# Patient Record
Sex: MALE | Race: WHITE | NOT HISPANIC OR LATINO
[De-identification: names, ages, dates, MRNs, and addresses within clinical notes are randomized per-mention and may not be internally consistent; named-entity substitution may affect disease eponyms.]

---
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
## 2018-03-28 ENCOUNTER — APPOINTMENT (OUTPATIENT)
Dept: NEUROLOGY | Facility: CLINIC | Age: 18
End: 2018-03-28
Payer: COMMERCIAL

## 2018-03-28 VITALS — BODY MASS INDEX: 20.88 KG/M2 | HEIGHT: 67 IN | WEIGHT: 133 LBS

## 2018-03-28 PROCEDURE — 99214 OFFICE O/P EST MOD 30 MIN: CPT

## 2018-06-27 ENCOUNTER — APPOINTMENT (OUTPATIENT)
Dept: NEUROLOGY | Facility: CLINIC | Age: 18
End: 2018-06-27
Payer: COMMERCIAL

## 2018-06-27 VITALS
WEIGHT: 128 LBS | BODY MASS INDEX: 20.09 KG/M2 | SYSTOLIC BLOOD PRESSURE: 107 MMHG | HEART RATE: 65 BPM | HEIGHT: 67 IN | DIASTOLIC BLOOD PRESSURE: 65 MMHG

## 2018-06-27 PROCEDURE — 99214 OFFICE O/P EST MOD 30 MIN: CPT

## 2018-06-28 ENCOUNTER — TRANSCRIPTION ENCOUNTER (OUTPATIENT)
Age: 18
End: 2018-06-28

## 2018-07-27 ENCOUNTER — TRANSCRIPTION ENCOUNTER (OUTPATIENT)
Age: 18
End: 2018-07-27

## 2018-08-25 ENCOUNTER — TRANSCRIPTION ENCOUNTER (OUTPATIENT)
Age: 18
End: 2018-08-25

## 2018-08-29 ENCOUNTER — APPOINTMENT (OUTPATIENT)
Dept: NEUROLOGY | Facility: CLINIC | Age: 18
End: 2018-08-29
Payer: COMMERCIAL

## 2018-08-29 VITALS
HEART RATE: 96 BPM | SYSTOLIC BLOOD PRESSURE: 108 MMHG | WEIGHT: 128 LBS | BODY MASS INDEX: 20.09 KG/M2 | DIASTOLIC BLOOD PRESSURE: 65 MMHG | HEIGHT: 67 IN

## 2018-08-29 PROCEDURE — 99214 OFFICE O/P EST MOD 30 MIN: CPT

## 2018-12-26 ENCOUNTER — APPOINTMENT (OUTPATIENT)
Dept: NEUROLOGY | Facility: CLINIC | Age: 18
End: 2018-12-26
Payer: COMMERCIAL

## 2018-12-26 VITALS
BODY MASS INDEX: 20.88 KG/M2 | HEART RATE: 98 BPM | WEIGHT: 133 LBS | SYSTOLIC BLOOD PRESSURE: 110 MMHG | DIASTOLIC BLOOD PRESSURE: 74 MMHG | HEIGHT: 67 IN

## 2018-12-26 PROCEDURE — 99214 OFFICE O/P EST MOD 30 MIN: CPT

## 2018-12-28 ENCOUNTER — TRANSCRIPTION ENCOUNTER (OUTPATIENT)
Age: 18
End: 2018-12-28

## 2019-01-23 ENCOUNTER — RX RENEWAL (OUTPATIENT)
Age: 19
End: 2019-01-23

## 2019-02-07 ENCOUNTER — MEDICATION RENEWAL (OUTPATIENT)
Age: 19
End: 2019-02-07

## 2019-03-05 ENCOUNTER — MEDICATION RENEWAL (OUTPATIENT)
Age: 19
End: 2019-03-05

## 2019-04-24 ENCOUNTER — APPOINTMENT (OUTPATIENT)
Dept: NEUROLOGY | Facility: CLINIC | Age: 19
End: 2019-04-24
Payer: COMMERCIAL

## 2019-04-24 VITALS
DIASTOLIC BLOOD PRESSURE: 77 MMHG | WEIGHT: 133 LBS | HEART RATE: 100 BPM | BODY MASS INDEX: 20.88 KG/M2 | SYSTOLIC BLOOD PRESSURE: 111 MMHG | HEIGHT: 67 IN

## 2019-04-24 PROCEDURE — 99214 OFFICE O/P EST MOD 30 MIN: CPT

## 2019-04-24 NOTE — ASSESSMENT
[FreeTextEntry1] : A/P-\par 1. Seizure disorder (past abnormal interictal EEG)- no seizures since on CBZ started. No definite adverse effects or lab abnormalities last time checked. \par - needs new set of labs including CBC, CMP, CBZ level, B12, TSH, A1C. CBZ level should be performed within one hour of CBZ dose.\par - Facility would like to hold on an Epidiolex trial\par \par 2. Behavior/ Impulse Control - Slightly better overall still with occasional head banging and rare biting when frustrated.\par - continue Abilify (would likely tolerate higher doses because of the CBZ hepatic induction)\par - continue clonidine (/77)\par - can consider low dose Kln if behavior worsens (start at 0.125 qd and increase to bid). If stopped would need slow taper considering risk of seizures.\par \par 3. Other- \par -would give extra 100 mg bid of CBZ on starting day prior to dental work for total 4 days\par - script for CBZ  sent\par \par \par f/u in 4-6m\par \par \par

## 2019-04-24 NOTE — PHYSICAL EXAM
[FreeTextEntry1] : Neurological Exam- \par Mental status-alert interacts with his mother and father, some vocalizations, self stim mvmts and laughter.\par Overall more placid than usual\par CN- EOM appear conjugate, face symmetric\par Motor - moves all extremities equally, no chorea or athetosis, no dystonia, no significant tremor\par Sensory- Deferred\par Gait- narrow based and steady, \par

## 2019-04-24 NOTE — HISTORY OF PRESENT ILLNESS
[FreeTextEntry1] : cc- f/u autism and seizures\par \par No seizures or suspicious events since last visit.\par Behavior overall better- Less head banging, biting etc.\par Did have head banging episode at the residential facility two weeks ago-?mother believes he may have had HA and did not get Tylenol.\par Sleep is about the same.\par Gained 8 lbs in last 9 days at home.\par \par meds- Abilify 5mg tid\par carbamazepine  bid \par clonidine 0.2mg hs \par vit D\par probiotics\par acetominophen 1000 mg prn

## 2019-05-06 ENCOUNTER — OTHER (OUTPATIENT)
Age: 19
End: 2019-05-06

## 2019-06-07 ENCOUNTER — MOBILE ON CALL (OUTPATIENT)
Age: 19
End: 2019-06-07

## 2019-06-10 ENCOUNTER — CHART COPY (OUTPATIENT)
Age: 19
End: 2019-06-10

## 2019-06-21 ENCOUNTER — OTHER (OUTPATIENT)
Age: 19
End: 2019-06-21

## 2019-06-26 ENCOUNTER — APPOINTMENT (OUTPATIENT)
Dept: NEUROLOGY | Facility: CLINIC | Age: 19
End: 2019-06-26
Payer: COMMERCIAL

## 2019-06-26 VITALS
BODY MASS INDEX: 20.4 KG/M2 | DIASTOLIC BLOOD PRESSURE: 68 MMHG | SYSTOLIC BLOOD PRESSURE: 105 MMHG | HEIGHT: 67 IN | HEART RATE: 81 BPM | WEIGHT: 130 LBS

## 2019-06-26 PROCEDURE — 99214 OFFICE O/P EST MOD 30 MIN: CPT

## 2019-06-26 NOTE — PHYSICAL EXAM
[FreeTextEntry1] : Neurological Exam- \par Mental status-alert interacts with his mother and father, some vocalizations, self stim mvmts and laughter.\par CN- EOM appear conjugate, face symmetric\par Motor -No tics now, moves all extremities equally, no chorea or athetosis, no dystonia, no significant tremor\par Sensory- Deferred\par Gait- narrow based and steady, \par

## 2019-06-26 NOTE — ASSESSMENT
[FreeTextEntry1] : A/P-\par 1. Seizure disorder (past abnormal interictal EEG)-two breakthrough seizures in May without clear triggers. \par -continue cbz at 600/d\par -awaiting an Epidiolex trial\par \par 2. Behavior/ Impulse Control - worse since two seizures and CBZ increased\par - CT head to r/o brain contusion , SDH etc.\par - Increase Abilify (likely lower levels because of the CBZ hepatic induction after increasing dose)\par - continue clonidine- BP stable\par \par 3. Other- \par -will hold off on vaccinations considering recent seizures\par \par f/u in 3-4 m\par \par \par f/u in 4-6m\par \par \par

## 2019-06-26 NOTE — HISTORY OF PRESENT ILLNESS
[FreeTextEntry1] : cc- f/u autism and seizures\par \par Had two seizures in May - no clear trigger. We increased the /d. Now more head banging etc.\par Family concerned about possible brain trauma- to have CT under sedation.\par No seizures since end of May. \par Gained a few pounds since at home. \par \par meds- Abilify 5mg tid\par carbamazepine  tid\par clonidine 0.2mg hs \par vit D\par probiotics\par acetominophen 1000 mg prn

## 2019-06-27 ENCOUNTER — OTHER (OUTPATIENT)
Age: 19
End: 2019-06-27

## 2019-07-01 ENCOUNTER — OUTPATIENT (OUTPATIENT)
Dept: OUTPATIENT SERVICES | Facility: HOSPITAL | Age: 19
LOS: 1 days | End: 2019-07-01
Payer: COMMERCIAL

## 2019-07-01 ENCOUNTER — APPOINTMENT (OUTPATIENT)
Age: 19
End: 2019-07-01

## 2019-07-01 VITALS — WEIGHT: 130.07 LBS

## 2019-07-01 DIAGNOSIS — Z92.89 PERSONAL HISTORY OF OTHER MEDICAL TREATMENT: Chronic | ICD-10-CM

## 2019-07-01 DIAGNOSIS — R56.9 UNSPECIFIED CONVULSIONS: ICD-10-CM

## 2019-07-01 DIAGNOSIS — H65.23 CHRONIC SEROUS OTITIS MEDIA, BILATERAL: Chronic | ICD-10-CM

## 2019-07-01 DIAGNOSIS — Z00.00 ENCOUNTER FOR GENERAL ADULT MEDICAL EXAMINATION WITHOUT ABNORMAL FINDINGS: ICD-10-CM

## 2019-07-01 PROCEDURE — 70450 CT HEAD/BRAIN W/O DYE: CPT | Mod: 26

## 2019-07-01 PROCEDURE — 70450 CT HEAD/BRAIN W/O DYE: CPT

## 2019-07-08 ENCOUNTER — MEDICATION RENEWAL (OUTPATIENT)
Age: 19
End: 2019-07-08

## 2019-07-09 ENCOUNTER — MEDICATION RENEWAL (OUTPATIENT)
Age: 19
End: 2019-07-09

## 2019-07-09 RX ORDER — CANNABIDIOL 100 MG/ML
100 SOLUTION ORAL
Qty: 1 | Refills: 0 | Status: ACTIVE | OUTPATIENT
Start: 2019-01-23

## 2019-08-21 ENCOUNTER — MEDICATION RENEWAL (OUTPATIENT)
Age: 19
End: 2019-08-21

## 2019-08-21 ENCOUNTER — APPOINTMENT (OUTPATIENT)
Dept: NEUROLOGY | Facility: CLINIC | Age: 19
End: 2019-08-21
Payer: COMMERCIAL

## 2019-08-21 VITALS
BODY MASS INDEX: 20.4 KG/M2 | HEART RATE: 84 BPM | DIASTOLIC BLOOD PRESSURE: 78 MMHG | SYSTOLIC BLOOD PRESSURE: 122 MMHG | HEIGHT: 67 IN | WEIGHT: 130 LBS

## 2019-08-21 PROCEDURE — 99214 OFFICE O/P EST MOD 30 MIN: CPT

## 2019-08-21 NOTE — HISTORY OF PRESENT ILLNESS
[FreeTextEntry1] : cc- f/u autism and seizures\par \par Since last visit started Epidiolex started 8/12 at 150 bid and increased to 300 bid three days ago.\par No seizures since last visit. A bit more related to his family, possibly from the CBD. \par Family still believes that the higher dose carbamazepine causes more behavioral issues, despite the increase in Abilify dose. \par Last three days- possible improvement in in head banging. \par \par meds- \par Epidiolex 300mg bid\par Abilify 7mg tid\par carbamazepine  bid\par clonidine 0.2mg hs \par vit D\par probiotics\par acetominophen 1000 mg prn

## 2019-08-21 NOTE — PHYSICAL EXAM
[FreeTextEntry1] : Neurological Exam- \par \par Mental status-seems a bit more engaged in the conversation today.\par constant rocking, some vocalizations and some head slapping.\par CN- EOM appear conjugate, face symmetric\par Motor -No tics now, moves all extremities equally, no chorea or athetosis, no dystonia, no significant tremor\par Sensory- Deferred\par Gait- narrow based and steady\par . \par

## 2019-08-21 NOTE — ASSESSMENT
[FreeTextEntry1] : A/P-\par 1. Seizure disorder (past abnormal interictal EEG)-no seizure since May on higher dose CBZ.  Now on 10mg/kg of Epidiolex also. May be able to reduce CBZ but will wait until mid-September when on this dose longer.\par Also need to consider a nonhepatic-inducing AED like Vimpat.\par -continue cbz at 600/d\par -increase Epidiolex to 400 bid on 9/10 and monitor behavior (max 20mg/kg, around 600mg bid- need to watch for diarrhea)\par - needs a new lab test- cbc, cmp, cbz level (would do Q2-3 months while changing meds)\par \par 2. Behavior/ Impulse Control - ?worse with CBZ increased despite Abilify increase. Hopefully higher dose CBD is helping.\par - continue clonidine- BP stable (122/78)\par \par 3. Sleep- appears to be sleeping longer ?from CBD\par \par f/u in 3-4 m\par \par \par

## 2019-08-22 ENCOUNTER — RX RENEWAL (OUTPATIENT)
Age: 19
End: 2019-08-22

## 2019-09-30 RX ORDER — DIAZEPAM 20 MG/4ML
20 GEL RECTAL
Qty: 2 | Refills: 0 | Status: ACTIVE | COMMUNITY
Start: 2019-09-30 | End: 1900-01-01

## 2019-09-30 RX ORDER — DIVALPROEX SODIUM 250 MG/1
250 TABLET, EXTENDED RELEASE ORAL
Qty: 270 | Refills: 3 | Status: ACTIVE | COMMUNITY
Start: 2019-09-30 | End: 1900-01-01

## 2019-10-02 ENCOUNTER — APPOINTMENT (OUTPATIENT)
Dept: NEUROLOGY | Facility: CLINIC | Age: 19
End: 2019-10-02

## 2019-10-07 ENCOUNTER — OTHER (OUTPATIENT)
Age: 19
End: 2019-10-07

## 2019-10-15 ENCOUNTER — OTHER (OUTPATIENT)
Age: 19
End: 2019-10-15

## 2019-10-22 ENCOUNTER — OTHER (OUTPATIENT)
Age: 19
End: 2019-10-22

## 2019-10-29 ENCOUNTER — OTHER (OUTPATIENT)
Age: 19
End: 2019-10-29

## 2019-11-12 ENCOUNTER — OTHER (OUTPATIENT)
Age: 19
End: 2019-11-12

## 2019-11-14 ENCOUNTER — OTHER (OUTPATIENT)
Age: 19
End: 2019-11-14

## 2019-11-27 ENCOUNTER — APPOINTMENT (OUTPATIENT)
Dept: NEUROLOGY | Facility: CLINIC | Age: 19
End: 2019-11-27
Payer: COMMERCIAL

## 2019-11-27 VITALS
HEIGHT: 67 IN | SYSTOLIC BLOOD PRESSURE: 119 MMHG | BODY MASS INDEX: 20.88 KG/M2 | HEART RATE: 105 BPM | WEIGHT: 133 LBS | DIASTOLIC BLOOD PRESSURE: 72 MMHG

## 2019-11-27 PROCEDURE — 99214 OFFICE O/P EST MOD 30 MIN: CPT

## 2019-11-27 NOTE — HISTORY OF PRESENT ILLNESS
[FreeTextEntry1] : cc- f/u autism and seizures\par \par Since last visit started on VPA and taken off CBZ but seizures increased. Behavior was better but mother believes it was from coming off CBZ.  Not clear if a forced normalization- seizures improving behavior.\par \par We restarted CBZ 11/12 and he had only had one brief 15 sec seizure.\par His behavior is now back to impulsive , head banging etc.\par \par  \par \par meds- \par Epidiolex 300mg bid\par Abilify 7mg tid\par carbamazepine  bid\par clonidine 0.1mg hs \par vit D\par probiotics\par acetominophen 1000 mg prn \par Depakote 250 qid\par \par

## 2019-11-27 NOTE — ASSESSMENT
[FreeTextEntry1] : \par A/P-\par 1. Seizure disorder (past abnormal interictal EEG)- Behavior improved on VPA and off CBZ but seizures returned.\par - attempt trial of levetiracetam 500mg/5ml\par - reduce Depakote to 250 tid\par - taper CBZ 100mg/ week until off\par - continue Epidiolex for now\par \par 2. Behavior/ Impulse Control - ?worse with CBZ increased despite Abilify and VPA.\par - continue clonidine at present dose\par \par 3. Sleep- worse since CBZ re-introduced\par \par f/u in 3-4 m\par \par

## 2019-11-27 NOTE — PHYSICAL EXAM
[FreeTextEntry1] : Neurological Exam- \par \par Mental status-more rocking and agitation that last visit\par constant rocking, some vocalizations and some head slapping.\par CN- EOM appear conjugate, face symmetric\par Motor -No tics now, moves all extremities equally, no chorea or athetosis, no dystonia, no significant tremor\par Sensory- Deferred\par Gait- narrow based and steady

## 2019-12-05 ENCOUNTER — RX RENEWAL (OUTPATIENT)
Age: 19
End: 2019-12-05

## 2019-12-23 ENCOUNTER — RX RENEWAL (OUTPATIENT)
Age: 19
End: 2019-12-23

## 2020-01-06 ENCOUNTER — OTHER (OUTPATIENT)
Age: 20
End: 2020-01-06

## 2020-04-08 ENCOUNTER — APPOINTMENT (OUTPATIENT)
Dept: NEUROLOGY | Facility: CLINIC | Age: 20
End: 2020-04-08
Payer: COMMERCIAL

## 2020-04-08 PROCEDURE — G2012 BRIEF CHECK IN BY MD/QHP: CPT

## 2020-04-08 NOTE — HISTORY OF PRESENT ILLNESS
[Verbal consent obtained from patient] : the patient, [unfilled] [FreeTextEntry1] : no seizures in 4m\par \par off keppra\par \par still on depakote, epidiolex and carbatrol\par Behavior is much better off Keppra!\par \par meds- \par Epidiolex 300mg bid\par Abilify 7mg tid\par carbamazepine  bid\par clonidine 0.1mg hs \par vit D\par probiotics\par \par \par A/P-\par 1. Seizure disorder (past abnormal interictal EEG)- Behavior improved on VPA and off CBZ but seizures returned.\par - cont Depakote to 250 tid, cont Carbatrol\par - continue Epidiolex for now\par \par 2. Behavior/ Impulse Control - ?worse with CBZ increased despite Abilify and VPA.\par - continue clonidine at present dose\par \par 3. Sleep- stable\par \par f/u in 3-4 m\par \par 9 mins on call

## 2020-07-22 ENCOUNTER — APPOINTMENT (OUTPATIENT)
Dept: NEUROLOGY | Facility: CLINIC | Age: 20
End: 2020-07-22
Payer: COMMERCIAL

## 2020-07-22 PROCEDURE — 99442: CPT

## 2020-07-22 NOTE — HISTORY OF PRESENT ILLNESS
[Home] : at home, [unfilled] , at the time of the visit. [Medical Office: ([facility])___] : at the medical office located in  [Parents] : parents [Verbal consent obtained from patient] : the patient, [unfilled] [FreeTextEntry1] : cc- sz\par \par no sz since last visit\par on 6/20 cbz <2.\par three days later had a one minute sz. mother believes he was dehydrated.\par \par meds- \par Epidiolex 300mg/d\par Abilify 7mg tid\par carbamazepine  bid\par Depakot 250 tid (42)\par clonidine 0.1mg hs \par vit D\par probiotics

## 2020-07-22 NOTE — ASSESSMENT
[FreeTextEntry1] : \par A/P-\par 1. Seizure disorder (past abnormal interictal EEG)- Behavior improved on VPA and off CBZ but seizures returned.\par - cont Depakote to 250 tid, cont Carbatrol 200 bid\par - continue reducing Epidiolex for now, taper by 100mg/week\par \par 2. Behavior/ Impulse Control - stable\par - continue clonidine at present dose\par \par 3. Sleep- stable\par \par f/u in 4-6 wks\par

## 2020-08-26 ENCOUNTER — APPOINTMENT (OUTPATIENT)
Dept: NEUROLOGY | Facility: CLINIC | Age: 20
End: 2020-08-26
Payer: COMMERCIAL

## 2020-08-26 VITALS — TEMPERATURE: 97.9 F

## 2020-08-26 VITALS
BODY MASS INDEX: 21.5 KG/M2 | HEIGHT: 67 IN | DIASTOLIC BLOOD PRESSURE: 75 MMHG | HEART RATE: 97 BPM | WEIGHT: 137 LBS | SYSTOLIC BLOOD PRESSURE: 117 MMHG

## 2020-08-26 PROCEDURE — 99214 OFFICE O/P EST MOD 30 MIN: CPT

## 2020-08-26 NOTE — ASSESSMENT
[FreeTextEntry1] : A/P-\par 1. Seizure disorder (past abnormal interictal EEG)- Behavior slightly worse last week or so, ?whether related to tapering CBD but doubt it.\par - continue Depakote to 250 tid\par - continue  bid - would switch to regular form as levels on chewable very low\par then check level\par - continue off of Epidiolex \par \par 2. Behavior/ Impulse Control - ?worse last week or so\par - continue clonidine/Abilify at present doses\par \par \par \par f/u in 3-4 m\par \par .

## 2020-08-26 NOTE — HISTORY OF PRESENT ILLNESS
[FreeTextEntry1] : cc- seizures, PDD\par \par At home until next week.\par Had a spike in behavioral outbursts last week. CBD tapered as of Aug 13.\par Mother feels not related to CBD but to missing his normal visits with family.\par No sz since last one in June\par (on 6/20 cbz <2- three days later had a one minute sz. mother believes he was dehydrated.)Not clar when was switched to chewable CBZ because two levels in a row were <2.\par \par meds- \par \par Abilify 7mg tid\par carbamazepine  bid\par Depakote 250 tid (42)\par clonidine 0.1mg hs \par vit D\par probiotics \par

## 2020-08-26 NOTE — PHYSICAL EXAM
[FreeTextEntry1] : Neurological Exam- \par \par Mental status-more agitated today\par  rocking, some vocalizations and some head slapping.\par CN- EOM appear conjugate, face symmetric\par Motor -No tics now, moves all extremities equally, no chorea or athetosis, no dystonia, no significant tremor\par Sensory- Deferred\par Gait- narrow based and steady.

## 2020-08-29 RX ORDER — CARBAMAZEPINE 100 MG/1
100 TABLET, EXTENDED RELEASE ORAL
Qty: 28 | Refills: 1 | Status: DISCONTINUED | COMMUNITY
Start: 2019-04-24 | End: 2020-08-29

## 2020-08-29 RX ORDER — CARBAMAZEPINE 200 MG/1
200 TABLET ORAL TWICE DAILY
Qty: 60 | Refills: 5 | Status: ACTIVE | COMMUNITY
Start: 2020-08-29 | End: 1900-01-01

## 2020-11-25 ENCOUNTER — APPOINTMENT (OUTPATIENT)
Dept: NEUROLOGY | Facility: CLINIC | Age: 20
End: 2020-11-25
Payer: COMMERCIAL

## 2020-11-25 PROCEDURE — 99214 OFFICE O/P EST MOD 30 MIN: CPT | Mod: 95

## 2020-11-25 NOTE — ASSESSMENT
[FreeTextEntry1] : \par A/P-\par 1. PDD and Seizure disorder (past abnormal interictal EEG)- Behavior improved after CBD restarted at 300 bid. CBZ level on 400/d has been around 2.5 despite CBD's hepatic inhibitory qualities. \par - continue Depakote to 250 tid,   bid and Epidiolex for seizure control (no seizures x 2mos)\par - if seizure free into February we will attempt to taper off the Depakote\par \par 2. Behavior/ Impulse Control - improved\par - continue Epidiolex, clonidine/Abilify at present doses\par \par F/U call in December

## 2020-11-25 NOTE — HISTORY OF PRESENT ILLNESS
[Home] : at home, [unfilled] , at the time of the visit. [Medical Office: ([facility])___] : at the medical office located in  [Verbal consent obtained from patient] : the patient, [unfilled] [FreeTextEntry1] : cc- seizures, PDD\par \par Behavior worse and Epidiolex started and next day had brief seizure on 9/27.\par No seizures since. Behavior much improved on Epidiolex.\par Sleeping about 7 hrs per night.\par \par 11/12- CBZ level 2.5\par \par meds- \par \par Abilify 7mg tid\par carbamazepine 200 bid (regular- NO chewables)\par Depakote 250 tid (38)\par clonidine 0.1mg hs \par vit D\par probiotics \par

## 2020-12-23 ENCOUNTER — APPOINTMENT (OUTPATIENT)
Dept: NEUROLOGY | Facility: CLINIC | Age: 20
End: 2020-12-23
Payer: COMMERCIAL

## 2020-12-23 PROCEDURE — 99213 OFFICE O/P EST LOW 20 MIN: CPT | Mod: 95

## 2020-12-23 NOTE — ASSESSMENT
[FreeTextEntry1] : \par A/P-\par 1. PDD and Seizure disorder (past abnormal interictal EEG)- No sz since last visit on  bid and CBZ  400/d \par - continue Depakote to 250 tid,  bid and Epidiolex for seizure control (no seizures x 3mos)\par - if seizure free into February we will attempt to taper off the Depakote\par \par 2. Behavior/ Impulse Control -stable per mother\par - continue Epidiolex, clonidine/Abilify at present doses\par \par F/U call in January\par \par

## 2020-12-23 NOTE — HISTORY OF PRESENT ILLNESS
[Home] : at home, [unfilled] , at the time of the visit. [Medical Office: ([facility])___] : at the medical office located in  [Parents] : parents [Verbal consent obtained from patient] : the patient, [unfilled] [FreeTextEntry1] : \par Visit done with parents. \par cc- seizures, PDD\par \par No seizure since last visit. Has had two seizures this year. \par Not allowed home for xmas. \par Behavior has been overall good.\par 11/12- CBZ level 2.5\par \par meds- \par \par Abilify 7mg tid\par carbamazepine 200 bid (regular- NO chewables)\par Depakote 250 tid (38)\par clonidine 0.1mg hs \par vit D\par probiotics \par

## 2021-02-03 ENCOUNTER — APPOINTMENT (OUTPATIENT)
Dept: NEUROLOGY | Facility: CLINIC | Age: 21
End: 2021-02-03
Payer: COMMERCIAL

## 2021-02-03 PROCEDURE — 99442: CPT

## 2021-02-05 RX ORDER — ARIPIPRAZOLE 2 MG/1
2 TABLET ORAL
Qty: 180 | Refills: 5 | Status: ACTIVE | COMMUNITY
Start: 2019-06-26 | End: 1900-01-01

## 2021-02-05 RX ORDER — ARIPIPRAZOLE 5 MG/1
5 TABLET ORAL
Qty: 30 | Refills: 3 | Status: ACTIVE | COMMUNITY
Start: 2019-08-21 | End: 1900-01-01

## 2021-02-05 RX ORDER — LEVETIRACETAM 100 MG/ML
500 INJECTION INTRAVENOUS
Qty: 300 | Refills: 11 | Status: DISCONTINUED | OUTPATIENT
Start: 2019-11-27 | End: 2021-02-05

## 2021-02-09 RX ORDER — CLONIDINE HYDROCHLORIDE 0.1 MG/1
0.1 TABLET ORAL
Qty: 30 | Refills: 0 | Status: ACTIVE | COMMUNITY
Start: 2021-02-09

## 2021-02-09 NOTE — HISTORY OF PRESENT ILLNESS
[Home] : at home, [unfilled] , at the time of the visit. [Medical Office: ([facility])___] : at the medical office located in  [Parents] : parents [Verbal consent obtained from patient] : the patient, [unfilled] [FreeTextEntry1] : overall behavior ok\par one seizure 30 sec on 1/17\par meds- \par \par Abilify 7mg tid\par carbamazepine 200 bid (regular- NO chewables)\par Depakote 250 tid (38)\par clonidine 0.1mg hs \par vit D\par probiotics \par Epidiolex 300mg bid\par

## 2021-02-09 NOTE — PLAN
[FreeTextEntry1] : A/P-\par 1. PDD and Seizure disorder (past abnormal interictal EEG)- Had two seizures in 2020 (June and Sept) and more recently 1/17.  Not clear why last CBZ still so low ?Epidiolex interaction.\par - continue Depakote to 250 tid,  bid and attempt reduce Epidiolex to 200 bid seizure control after 2nd vaccine dose. \par - Will reduce Epidiolex to 200 mg bid on 2/15.\par - labs 2nd week of March\par \par 2. Behavior/ Impulse Control -periodic aggression but overall \par - continue Epidiolex, clonidine/Abilify at present doses for now\par \par F/U call in March\par

## 2021-03-30 ENCOUNTER — APPOINTMENT (OUTPATIENT)
Dept: NEUROLOGY | Facility: CLINIC | Age: 21
End: 2021-03-30
Payer: COMMERCIAL

## 2021-03-30 DIAGNOSIS — F84.0 AUTISTIC DISORDER: ICD-10-CM

## 2021-03-30 PROCEDURE — 99213 OFFICE O/P EST LOW 20 MIN: CPT | Mod: 95

## 2021-04-06 LAB
ALBUMIN SERPL ELPH-MCNC: 4.5 G/DL
ALP BLD-CCNC: 205 U/L
ALT SERPL-CCNC: 16 U/L
ANION GAP SERPL CALC-SCNC: 12 MMOL/L
AST SERPL-CCNC: 14 U/L
BASOPHILS # BLD AUTO: 0.09 K/UL
BASOPHILS NFR BLD AUTO: 1.1 %
BILIRUB SERPL-MCNC: 0.2 MG/DL
BUN SERPL-MCNC: 15 MG/DL
CALCIUM SERPL-MCNC: 9.7 MG/DL
CARBAMAZEPINE SERPL-MCNC: 3.1 UG/ML
CHLORIDE SERPL-SCNC: 104 MMOL/L
CO2 SERPL-SCNC: 25 MMOL/L
CREAT SERPL-MCNC: 0.91 MG/DL
EOSINOPHIL # BLD AUTO: 0.38 K/UL
EOSINOPHIL NFR BLD AUTO: 4.7 %
GLUCOSE SERPL-MCNC: 92 MG/DL
HCT VFR BLD CALC: 47.1 %
HGB BLD-MCNC: 16 G/DL
IMM GRANULOCYTES NFR BLD AUTO: 0.2 %
LYMPHOCYTES # BLD AUTO: 2.55 K/UL
LYMPHOCYTES NFR BLD AUTO: 31.4 %
MAN DIFF?: NORMAL
MCHC RBC-ENTMCNC: 31.1 PG
MCHC RBC-ENTMCNC: 34 GM/DL
MCV RBC AUTO: 91.6 FL
MONOCYTES # BLD AUTO: 0.79 K/UL
MONOCYTES NFR BLD AUTO: 9.7 %
NEUTROPHILS # BLD AUTO: 4.28 K/UL
NEUTROPHILS NFR BLD AUTO: 52.9 %
PLATELET # BLD AUTO: 181 K/UL
POTASSIUM SERPL-SCNC: 4.1 MMOL/L
PROT SERPL-MCNC: 6.6 G/DL
RBC # BLD: 5.14 M/UL
RBC # FLD: 11.8 %
SODIUM SERPL-SCNC: 140 MMOL/L
VALPROATE SERPL-MCNC: 36 UG/ML
WBC # FLD AUTO: 8.11 K/UL

## 2021-04-29 NOTE — ADDENDUM
[FreeTextEntry1] : When increasing Epidiolex, may start with 250 mg bid for two weeks. If behavior improved then hold there, if not can consider 300mg bid.

## 2021-04-29 NOTE — HISTORY OF PRESENT ILLNESS
[Home] : at home, [unfilled] , at the time of the visit. [Medical Office: ([facility])___] : at the medical office located in  [Parents] : parents [Verbal consent obtained from patient] : the patient, [unfilled] [FreeTextEntry1] : cc- seizures and autism\par \par Has had a reduction in sleep hours and a slight increase in behaviors (pinching, slapping)- no head banging now.\par one seizure 2/12-  3 days after vaccine in AM prior to his meds. \par Seizure lasted two mins and 30 seconds.\par \par meds- \par Abilify 7mg tid\par carbamazepine 200 bid (regular- NO chewables)-2\par Depakote 250 tid (34)\par clonidine 0.1mg hs \par vit D\par probiotics \par Epidiolex 200mg bid\par \par

## 2021-04-29 NOTE — ASSESSMENT
[FreeTextEntry1] : A/P-\par 1. PDD and Seizure disorder (past abnormal interictal EEG)- Had one brief seizure on 1/17 and a longer seizure on 2/12 three days after Covid Vaccine. Last CBZ on 3/12 still low at 2.0 No increase with reduction of Epidiolex.\par Behaviors slightly worse on lower Epidiolex.\par - continue Depakote to 250 tid,  bid for now\par - check new AED levels\par - After that consider increasing Epidiolex back to 300mg bid and increasing further, if CBZ is still low (maybe to tid dosing as prior increased CBZ doses worsened behavior) \par \par 2. Behavior/ Impulse Control -slight increase in pinching etc but no head banging \par - possible increase of Epidiolex as above, continue clonidine/Abilify at present doses\par \par F/U call after labs back\par \par

## 2021-05-18 ENCOUNTER — APPOINTMENT (OUTPATIENT)
Dept: NEUROLOGY | Facility: CLINIC | Age: 21
End: 2021-05-18
Payer: COMMERCIAL

## 2021-05-18 PROCEDURE — 99213 OFFICE O/P EST LOW 20 MIN: CPT | Mod: 95

## 2021-05-18 NOTE — HISTORY OF PRESENT ILLNESS
[Home] : at home, [unfilled] , at the time of the visit. [Medical Office: ([facility])___] : at the medical office located in  [Verbal consent obtained from patient] : the patient, [unfilled] [FreeTextEntry1] : cc- seizures and autism\par \par Epidiolex finally increased on May 2nd. At the time had very bad behaviors.  Mother believes he was having dental pain. She believes that when I ordered an extra 100mg extra Epidiolex his behavior was better within 24h.  Now missing a tooth and may need an implant.  However, has had two seizures 5- 6 days after anesthesia for wisdom teeth being pulled.\par \par meds- \par Abilify 7mg tid\par carbamazepine 200 bid (regular- NO chewables)- levels 2.8 and 3.1\par Depakote 250 tid \par clonidine 0.1mg hs \par vit D\par probiotics \par Epidiolex 250 mg bid\par \par \par

## 2021-05-18 NOTE — ASSESSMENT
[FreeTextEntry1] : A/P-\par 1. PDD and Seizure disorder (past abnormal interictal EEG)- Had one brief seizure on 1/17 and a longer seizure on 2/12 three days after Covid Vaccine. No seizures since on CBZ with level around 3.\par - continue Depakote to 250 tid,  bid for now\par - Continue Epidiolex 250 mg bid and increase to 300 bid if still behavior is poor.\par \par 2. Behavior/ Impulse Control -\par - possible increase of Epidiolex as above, continue clonidine/Abilify at present doses\par \par 3. Dental- may need implant ant 3 procedures with GA- will need to increase CBZ two days before and 7 days after. \par \par RTC 3m\par \par

## 2021-06-30 ENCOUNTER — NON-APPOINTMENT (OUTPATIENT)
Age: 21
End: 2021-06-30

## 2021-06-30 ENCOUNTER — APPOINTMENT (OUTPATIENT)
Dept: NEUROLOGY | Facility: CLINIC | Age: 21
End: 2021-06-30
Payer: COMMERCIAL

## 2021-06-30 PROCEDURE — 99214 OFFICE O/P EST MOD 30 MIN: CPT | Mod: 95

## 2021-06-30 NOTE — HISTORY OF PRESENT ILLNESS
[Home] : at home, [unfilled] , at the time of the visit. [Medical Office: ([facility])___] : at the medical office located in  [Mother] : mother [Verbal consent obtained from patient] : the patient, [unfilled] [FreeTextEntry1] : cc- seizures and autism\par \par Home for a week without aggression.\par Has had three instances where he stares off.  After name called he started banging his head. Did it a second time.\par No definite seizures.\par To have dental work in Sept (last time tolerated anesthesia without seizures on /d)\par Epidiolex increase has helped his sleep.\par \par meds- \par Abilify 7mg tid\par carbamazepine 200 bid (regular- No chewables)- levels 2.8 and 3.1\par Depakote 250 tid \par clonidine 0.1mg hs \par vit D\par probiotics \par Epidiolex 250 mg bid

## 2021-06-30 NOTE — ASSESSMENT
[FreeTextEntry1] : A/P-\par 1. PDD and Seizure disorder (past abnormal interictal EEG with both focal and generalized seizures)- No seizures since last visit. Never clear that VPA helped either his seizures or behavior.\par - attempt to taper Depakote by 125mg q2 weeks ( observe for increased absences as well as GTCs)\par - continue  bid for now\par - Continue Epidiolex 250 mg bid and increase to 300 bid if still behavior is poor.\par \par 2. Behavior/ Impulse Control -\par - Has had some head banging but no aggression of late.  Also sleeping better since Epidiolex increased to 250 bid\par - cont clonidine and Abilify\par \par 3. Dental- may need implant ant 3 procedures with GA- will need to increase CBZ two days before and 7 days after. \par \par RTC 3m\par \par Addendum- will need cbc, cmp, levels in 6 weeks and again two weeks after depakote stopped

## 2021-07-02 ENCOUNTER — NON-APPOINTMENT (OUTPATIENT)
Age: 21
End: 2021-07-02

## 2021-07-02 ENCOUNTER — APPOINTMENT (OUTPATIENT)
Dept: NEUROLOGY | Facility: CLINIC | Age: 21
End: 2021-07-02
Payer: COMMERCIAL

## 2021-07-02 PROCEDURE — 99442: CPT

## 2021-08-25 NOTE — HISTORY OF PRESENT ILLNESS
[Home] : at home, [unfilled] , at the time of the visit. [Medical Office: ([facility])___] : at the medical office located in  [Verbal consent obtained from patient] : the patient, [unfilled] [FreeTextEntry1] : cc- sz\par \par july seizure and hit his head \par aug 5 reduce vpa, sz aug 17.\par cbz 200-100-200  tid\par since random head banging and HA\par tylenol reduces head banging\par \par mood seems better on one less vpa but head banging worse\par \par plan- will continue current meds but will give tylenol around clock to see if head banging improved \par if it does then trial of gabapentin\par \par

## 2021-08-26 ENCOUNTER — APPOINTMENT (OUTPATIENT)
Dept: NEUROLOGY | Facility: CLINIC | Age: 21
End: 2021-08-26
Payer: COMMERCIAL

## 2021-08-26 ENCOUNTER — NON-APPOINTMENT (OUTPATIENT)
Age: 21
End: 2021-08-26

## 2021-08-26 PROCEDURE — 99441: CPT

## 2021-09-01 ENCOUNTER — NON-APPOINTMENT (OUTPATIENT)
Age: 21
End: 2021-09-01

## 2021-09-28 ENCOUNTER — APPOINTMENT (OUTPATIENT)
Dept: NEUROLOGY | Facility: CLINIC | Age: 21
End: 2021-09-28
Payer: COMMERCIAL

## 2021-09-28 PROCEDURE — 99213 OFFICE O/P EST LOW 20 MIN: CPT | Mod: 95

## 2021-09-28 NOTE — ASSESSMENT
[FreeTextEntry1] : \par A/P-\par 1. PDD and Seizure disorder (past abnormal interictal EEG with both focal and generalized seizures)- Last seizure was 8/17.  Patient will have dental procedure and family vacation. \par -hold on taper of  Depakote until after dental work\par - continue  tid and Epidiolex 250 mg bid \par \par 2. Behavior/ Impulse Control -\par - Head banging better ?from gabapentin and better sleep.\par - cont clonidine, Abilify, Epidiolex and gabapentin 300 tid\par \par 3. Dental- may need implant and 3 procedures with GA- will need to increase CBZ to 700-800mg/d two days before and 7 days after. \par \par RTC 6-8 weeks\par

## 2021-09-28 NOTE — HISTORY OF PRESENT ILLNESS
[Home] : at home, [unfilled] , at the time of the visit. [Medical Office: ([facility])___] : at the medical office located in  [Mother] : mother [Verbal consent obtained from patient] : the patient, [unfilled] [FreeTextEntry1] : cc- seizures and autism\par \par Since last visit attempted trial of gabapentin. At first appeared blunted down, but better over the last three days with less head banging. \par Sleeping 9 hrs.\par \par Having dental surgery 10/27. \par Last seizure 8/17- one minute (vpa reduced 8/5)\par \par \par meds-\par depakote to 250mg AM -125mg PM and 250mg hs (30)\par -200- 200 (3.4)\par Epidiolex 250mg bid\par  Abilify 7mg tid\par clonidine 0.1mg hs\par gabapentin 300 tid\par \par \par \par . \par  \par \par

## 2021-11-03 ENCOUNTER — APPOINTMENT (OUTPATIENT)
Dept: NEUROLOGY | Facility: CLINIC | Age: 21
End: 2021-11-03
Payer: COMMERCIAL

## 2021-11-03 DIAGNOSIS — R56.9 UNSPECIFIED CONVULSIONS: ICD-10-CM

## 2021-11-03 PROCEDURE — 99214 OFFICE O/P EST MOD 30 MIN: CPT | Mod: 95

## 2021-11-03 NOTE — ASSESSMENT
[FreeTextEntry1] : \par A/P-\par 1. PDD and Seizure disorder (past abnormal interictal EEG with both focal and generalized seizures)- Last seizure was 8/17. Tolerated dental procedure. \par -continue Depakote 250-125-250\par - Return CBZ back to 200 tid and Epidiolex 250 mg bid \par \par 2. Behavior/ Impulse Control -\par - Severe head banging better but smaller behaviors may be worse\par - continue clonidine, Abilify, Epidiolex present doses (levels may go up after CBZ reduced)\par - wait one week and then reduce gabapentin to 300mg in AM and 300mg PM/hs\par \par RTC 6-8 weeks\par

## 2021-11-03 NOTE — HISTORY OF PRESENT ILLNESS
[Home] : at home, [unfilled] , at the time of the visit. [Medical Office: ([facility])___] : at the medical office located in  [Mother] : mother [Verbal consent obtained from patient] : the patient, [unfilled] [FreeTextEntry1] : cc- seizures and autism\par \par Since last visit had dental surgery- increased his CBZ and no post-operative seizures.\par Center states that his behavior has been worse since gabapentin. \par According to mother much better sleep and behavior since August when gabapentin started.\par She states severe head banging is better. \par \par Having dental surgery 10/27. \par Last seizure 8/17- one minute (vpa reduced 8/5)\par \par meds-\par Depakote to 250mg AM -125 mg PM and 250 mg hs \par -200-200 \par Epidiolex 250mg bid\par Abilify 7mg tid\par clonidine 0.1mg hs\par gabapentin 300 tid\par \par

## 2021-11-17 LAB
25(OH)D3 SERPL-MCNC: 31.6 NG/ML
ALBUMIN SERPL ELPH-MCNC: 4.7 G/DL
ALP BLD-CCNC: 241 U/L
ALT SERPL-CCNC: 15 U/L
ANION GAP SERPL CALC-SCNC: 17 MMOL/L
AST SERPL-CCNC: 13 U/L
BASOPHILS # BLD AUTO: 0.06 K/UL
BASOPHILS NFR BLD AUTO: 0.9 %
BILIRUB SERPL-MCNC: 0.2 MG/DL
BUN SERPL-MCNC: 12 MG/DL
CALCIUM SERPL-MCNC: 9.9 MG/DL
CARBAMAZEPINE SERPL-MCNC: 3 UG/ML
CHLORIDE SERPL-SCNC: 102 MMOL/L
CO2 SERPL-SCNC: 21 MMOL/L
COVID-19 NUCLEOCAPSID  GAM ANTIBODY INTERPRETATION: NEGATIVE
COVID-19 SPIKE DOMAIN ANTIBODY INTERPRETATION: POSITIVE
CREAT SERPL-MCNC: 0.87 MG/DL
EOSINOPHIL # BLD AUTO: 0.21 K/UL
EOSINOPHIL NFR BLD AUTO: 3.2 %
GLUCOSE SERPL-MCNC: 111 MG/DL
HCT VFR BLD CALC: 44.4 %
HGB BLD-MCNC: 15.8 G/DL
IMM GRANULOCYTES NFR BLD AUTO: 0.2 %
LYMPHOCYTES # BLD AUTO: 2 K/UL
LYMPHOCYTES NFR BLD AUTO: 30.7 %
MAN DIFF?: NORMAL
MCHC RBC-ENTMCNC: 32.2 PG
MCHC RBC-ENTMCNC: 35.6 GM/DL
MCV RBC AUTO: 90.4 FL
MONOCYTES # BLD AUTO: 0.56 K/UL
MONOCYTES NFR BLD AUTO: 8.6 %
NEUTROPHILS # BLD AUTO: 3.67 K/UL
NEUTROPHILS NFR BLD AUTO: 56.4 %
PLATELET # BLD AUTO: 218 K/UL
POTASSIUM SERPL-SCNC: 4.2 MMOL/L
PROT SERPL-MCNC: 6.9 G/DL
RBC # BLD: 4.91 M/UL
RBC # FLD: 11.9 %
SARS-COV-2 AB SERPL IA-ACNC: 178 U/ML
SARS-COV-2 AB SERPL QL IA: 0.08 INDEX
SODIUM SERPL-SCNC: 140 MMOL/L
TSH SERPL-ACNC: 0.91 UIU/ML
VALPROATE SERPL-MCNC: 24 UG/ML
VIT B12 SERPL-MCNC: 637 PG/ML
WBC # FLD AUTO: 6.51 K/UL

## 2021-11-18 LAB — AMMONIA PLAS-MCNC: 18.2 UMOL/L

## 2021-12-22 ENCOUNTER — APPOINTMENT (OUTPATIENT)
Dept: NEUROLOGY | Facility: CLINIC | Age: 21
End: 2021-12-22
Payer: COMMERCIAL

## 2021-12-22 PROCEDURE — 99442: CPT

## 2021-12-22 NOTE — HISTORY OF PRESENT ILLNESS
[Home] : at home, [unfilled] , at the time of the visit. [Medical Office: ([facility])___] : at the medical office located in  [Mother] : mother [Verbal consent obtained from patient] : the patient, [unfilled] [Time Spent: ___ minutes] : I have spent [unfilled] minutes with the patient on the telephone [FreeTextEntry1] : cc- seizures and autism\par \par Mother met with psychiatrist Dr. Hightower. \par No longer sleeping in afternoon since gabapentin reduced. Does help him sleep.\par Had dental surgery without incident. \par \par At home now , some mild head banging and pinching but not severe. \par Sleeping 9 hrs.\par \par Having dental surgery 10/27. \par Last seizure 8/17/21- one minute (vpa reduced 8/5)\par \par meds-\par depakote to 250mg AM -125mg PM and 250mg hs (24)\par -200- 200 (trough - 3.0)\par Epidiolex 250 mg bid\par Abilify 7mg tid\par clonidine 0.1mg hs\par gabapentin 300 tid\par \par

## 2021-12-22 NOTE — PLAN
[FreeTextEntry1] : \par A/P-\par 1. PDD and Seizure disorder (past abnormal interictal EEG)- Last seizure on 8/17/21\par - continue Depakote to 250 tid,  bid for now\par - Now that sleeping better on gabapentin reduce Epidiolex 200 mg bid to try and reduce drug interactions.\par - New labs and med levels in 4 weeks\par \par 2. Behavior/ Impulse Control -\par - Continue gabapentin,clonidine/Abilify at present doses\par \par RTC 6 wks\par

## 2022-02-16 ENCOUNTER — APPOINTMENT (OUTPATIENT)
Dept: NEUROLOGY | Facility: CLINIC | Age: 22
End: 2022-02-16
Payer: COMMERCIAL

## 2022-02-16 PROCEDURE — 99214 OFFICE O/P EST MOD 30 MIN: CPT | Mod: 95

## 2022-02-16 NOTE — ASSESSMENT
[FreeTextEntry1] : A/P-\par 1. PDD and Seizure disorder (past abnormal interictal EEG)- Last seizure on 8/17/21\par - continue Depakote to 250 tid,  tid\par - continue Epidiolex 200 mg bid \par \par 2. Behavior/ Impulse Control -\par - Continue gabapentin,clonidine/Abilify at present doses\par - consider QID dosing or increased Abilify dose if clear wearing off effect\par \par 3. Plan for dental work- If CBZ level less than 5 (drawn yesterday)\par \par Day prior to procedure- -200-300\par Day of procedure- -200-200\par Day after procedure for 5 straight days -200-200\par then back to -200-200\par \par RTC 2m\par \par

## 2022-02-16 NOTE — HISTORY OF PRESENT ILLNESS
[Home] : at home, [unfilled] , at the time of the visit. [Medical Office: ([facility])___] : at the medical office located in  [Mother] : mother [Verbal consent obtained from patient] : the patient, [unfilled] [FreeTextEntry1] : cc- seizures and autism\par \par Had COVID - tested positive after exposure in January but no definite symptoms.\par Quarantined for two weeks.\par No seizures since last visit.\par \par Was home in late January and behavior was not great- head banging with lacerations.  Gave him Tylenol.\par Since then, the center said he is doing ok. Had no new forehead injuries as of last week.\par Mother and staff believe behavior may get worse as Abilify dose wearing off.\par \par Next dental procedure is on 3/9.\par New CBZ level is pending from yesterday.\par \par Last seizure 8/17/21- one minute (vpa reduced 8/5)\par \par meds-\par Depakote to 250mg AM -125mg PM and 250mg hs \par -200- 200 \par Epidiolex 200 mg bid as of Jan 6th\par Abilify 7mg tid\par clonidine 0.1mg hs\par gabapentin 300 tid\par

## 2022-03-30 ENCOUNTER — APPOINTMENT (OUTPATIENT)
Dept: NEUROLOGY | Facility: CLINIC | Age: 22
End: 2022-03-30
Payer: COMMERCIAL

## 2022-03-30 PROCEDURE — 99213 OFFICE O/P EST LOW 20 MIN: CPT | Mod: 95

## 2022-03-30 NOTE — ASSESSMENT
[FreeTextEntry1] : \par A/P-\par 1. PDD and Seizure disorder (past abnormal interictal EEG)- Last seizure on 8/17/21\par - continue Depakote to 250-125-250 tid,  tid\par \par - needs new labs and levels\par \par 2. Behavior/ Impulse Control - increase in behaviors\par - Continue gabapentin,clonidine/Abilify at present doses\par -- Increase Epidiolex back 250 mg bid \par \par 3. Plan for dental work- If CBZ level less than 5 (drawn yesterday)\par \par Day prior to procedure- -200-300\par Day of procedure- -172-310-200\par -[id number] for 3days\par then back 200 tid\par \par RTC 2m\par
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
## 2022-05-11 ENCOUNTER — NON-APPOINTMENT (OUTPATIENT)
Age: 22
End: 2022-05-11

## 2022-05-18 ENCOUNTER — APPOINTMENT (OUTPATIENT)
Dept: NEUROLOGY | Facility: CLINIC | Age: 22
End: 2022-05-18
Payer: COMMERCIAL

## 2022-05-18 PROCEDURE — 99442: CPT

## 2022-06-21 NOTE — HISTORY OF PRESENT ILLNESS
[Home] : at home, [unfilled] , at the time of the visit. [Medical Office: ([facility])___] : at the medical office located in  [Mother] : mother [FreeTextEntry1] : \par cc- seizures and autism\par \par Seizure at home at 1130PM after receiving dose at 830PM.  Was a 1min 23 sec GTC.  Was banging his head the entire day.\par Behavior and aggression is a bit worse. \par Has had two recent falls.\par \par Last seizure prior 8/17/21- one minute (vpa reduced 8/5)\par \par meds-\par Depakote to 250mg AM -125mg PM and 250mg hs \par -200- 200  (3.6 )\par Epidiolex 250 mg bid as of Jan 6th\par Abilify 7mg tid\par clonidine 0.1mg hs\par gabapentin 300 bid \par

## 2022-06-21 NOTE — ASSESSMENT
[FreeTextEntry1] : \par A/P-\par 1. PDD and Seizure disorder (past abnormal interictal EEG)- Last seizure about one week ago. \par - continue Depakote to 250-125-250 tid,  tid\par - CBZ level - 3.6 (need timing of draw)\par \par 2. Behavior/ Impulse Control - increase in behaviors\par - Continue gabapentin,clonidine/Abilify at present doses\par -- Continue Epidiolex 250 mg bid \par - considering increase in CBZ level may need increase in Abilify (suggest increase dose to 10-7-7)\par \par 3. Two recent falls with rapid recovery of consciousness or no loss of consciousness.\par Rule out syncope (?orthostatic hypotension), rule out atonic seizures\par - need orthostatic BPs\par - if BP marginal can stop clonidine\par - if loss of tone at onset then more likely seizure\par \par RTC 3m\par

## 2022-07-13 ENCOUNTER — APPOINTMENT (OUTPATIENT)
Dept: NEUROLOGY | Facility: CLINIC | Age: 22
End: 2022-07-13

## 2022-07-13 PROCEDURE — 99214 OFFICE O/P EST MOD 30 MIN: CPT | Mod: 95

## 2022-07-13 NOTE — ASSESSMENT
[FreeTextEntry1] : A/P-\par 1. PDD and Seizure disorder (past abnormal interictal EEG)- Last seizure 5/10/22.  ? whether that was also related to BP and clonidine.\par - continue Depakote to 250-125-250 tid,  tid\par - last CBZ level - 3.6\par \par 2. Behavior/ Impulse Control - behavior better of late- no worsening off clonidine. \par - Continue gabapentin, Abilify at present doses\par - Continue Epidiolex 250 mg bid \par \par 3. Falls- ?one possible fall in June since clonidine stopped. \par \par 4. Dental surgery on 7/19/22.\par Cleared for procedure.\par will give:\par Day prior to procedure- -200-300\par Day of procedure- -200-300\par Day after - CBZ- 200-200-300\par \par 4. Hx Chiari- no changes on exam, gait, incontinence\par - consider MRI brain this fall.\par \par RTC 3m\par

## 2022-07-13 NOTE — HISTORY OF PRESENT ILLNESS
[Home] : at home, [unfilled] , at the time of the visit. [Medical Office: ([facility])___] : at the medical office located in  [Verbal consent obtained from patient] : the patient, [unfilled] [FreeTextEntry1] : cc- seizures and autism\par \par Last seizure in May- none since. One fall in May. Since clonidine has been off.\par Was sitting on floor in June ?fall but no witnesses. \par End of June came home and behavior was better.\par Sleeping a bit less, napping late morning now.\par Overall behavior better. \par \par To have his dental implant next week.  \par \par meds-\par Depakote to 250mg AM -125mg PM and 250mg hs \par -200- 200 \par Epidiolex 250 mg bid \par Abilify 7mg tid\par gabapentin 300 bid \par

## 2022-08-12 ENCOUNTER — NON-APPOINTMENT (OUTPATIENT)
Age: 22
End: 2022-08-12

## 2022-08-12 ENCOUNTER — APPOINTMENT (OUTPATIENT)
Dept: NEUROLOGY | Facility: CLINIC | Age: 22
End: 2022-08-12

## 2022-08-12 PROCEDURE — 99442: CPT

## 2022-08-29 RX ORDER — CARBAMAZEPINE 100 MG/1
100 CAPSULE, EXTENDED RELEASE ORAL
Qty: 210 | Refills: 5 | Status: ACTIVE | COMMUNITY
Start: 2022-08-29 | End: 1900-01-01

## 2022-09-28 ENCOUNTER — APPOINTMENT (OUTPATIENT)
Dept: NEUROLOGY | Facility: CLINIC | Age: 22
End: 2022-09-28

## 2022-09-28 PROCEDURE — 99214 OFFICE O/P EST MOD 30 MIN: CPT | Mod: 95

## 2022-09-29 DIAGNOSIS — Z01.812 ENCOUNTER FOR PREPROCEDURAL LABORATORY EXAMINATION: ICD-10-CM

## 2022-09-30 NOTE — ASSESSMENT
[FreeTextEntry1] : \par A/P-\par 1. PDD and Seizure disorder (past abnormal interictal EEG)- Last seizure 9/9 - 30 second event. \par - continue Depakote to 250-125-250 tid, Carbatrol 200-200-300\par - last CBZ level - 3.9\par - needs new labs in 4 weeks\par \par 2. Behavior/ Impulse Control - behavior better of late- no worsening off clonidine. \par - Continue gabapentin, Abilify at present doses\par - reduce Epidiolex 200 mg bid \par \par 3. Falls- no falls since last visit\par \par 4. Hx Chiari- no changes on exam, gait, incontinence\par - consider MRI brain late November\par \par Hold off on all Covid boosters\par \par RTC 3m\par \par

## 2022-09-30 NOTE — HISTORY OF PRESENT ILLNESS
[FreeTextEntry1] : cc- seizures\par \par Changed his Cbz to Carbatrol.  Dose increased by 100 mg after August seizure- level was 3.9.\par Mother thinks his personality better - happier.  Less head banging.\par Sleep better. Had one 30 sec seizure on 9/9 at 10 PM.\par Mother has noted a brief jerk at night- ?myoclonus with a groan.  Wakes right away. \par No recent falls. \par Center and mother feel that Epidiolex may be causing falls and lower CBZ level.\par \par meds-\par Depakote to 250mg AM -125mg PM and 250mg hs \par -200- 200 \par Epidiolex 250 mg bid \par Abilify 7mg tid\par gabapentin 300 bid \par

## 2022-11-16 ENCOUNTER — APPOINTMENT (OUTPATIENT)
Dept: NEUROLOGY | Facility: CLINIC | Age: 22
End: 2022-11-16

## 2022-11-16 PROCEDURE — 99214 OFFICE O/P EST MOD 30 MIN: CPT | Mod: 95

## 2022-11-16 NOTE — ASSESSMENT
[FreeTextEntry1] : \par A/P-\par 1. PDD and Seizure disorder (past abnormal interictal EEG)- No seizures since 9/9 - 30 second event. \par - continue Depakote to 250-125-250 tid, Carbatrol 200-200-300\par \par 2. Behavior/ Impulse Control - behavior better of late- no worsening off clonidine. \par - Continue gabapentin, Abilify at present doses\par - reduce Epidiolex 200 mg bid \par \par 3. Falls- no falls since last visit\par \par 4. Hx Chiari- no changes on exam, gait more stable, incontinence\par - MRI brain next week with sedation\par \par NEEDS NEW LABS PRIOR TO SEDATION\par IF CBZ LEVEL IS LESS THAN 4 WILL GIVE EXTRA 100MG CBZ PRIOR TO PROCEDURE\par \par \par RTC 3m\par

## 2022-11-16 NOTE — HISTORY OF PRESENT ILLNESS
[Home] : at home, [unfilled] , at the time of the visit. [Medical Office: ([facility])___] : at the medical office located in  [Verbal consent obtained from patient] : the patient, [unfilled] [FreeTextEntry1] : cc- seizures\par \par No seizures since last visit.  Last seizure was 30 sec seizure on 9/9.\par Behaviors may be slightly worse since reducing Epidiolex.\par No falls since last visit.\par To have MRI brain with sedation next week.\par \par meds-\par Depakote to 250mg AM -125mg PM and 250mg hs \par Carbatrol 200-200- 300 \par Epidiolex 200 mg bid \par Abilify 7mg tid\par gabapentin 300 bid \par
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
## 2023-01-11 ENCOUNTER — APPOINTMENT (OUTPATIENT)
Dept: NEUROLOGY | Facility: CLINIC | Age: 23
End: 2023-01-11
Payer: COMMERCIAL

## 2023-01-11 PROCEDURE — 99214 OFFICE O/P EST MOD 30 MIN: CPT | Mod: 95

## 2023-01-11 NOTE — ASSESSMENT
[FreeTextEntry1] : \par A/P-\par 1. PDD and Seizure disorder (past abnormal interictal EEG)- No seizures since 9/9 - 30 second event. \par - continue Depakote to 250-125-250 tid, Carbatrol (generic) 300-200-300\par - * If breakthrough seizures switch to brand Carbatrol*\par \par 2. Behavior/ Impulse Control - behavior stable past month.\par - Continue gabapentin, Abilify at present doses\par - continue Epidiolex 200 mg bid - if head banging worse then increase Epidiolex\par \par 3. Falls- no falls since last visit\par \par 4. Hx Chiari- never had MRI brain- to be done next week\par \par STILL NEEDS NEW LABS PRIOR TO SEDATION\par IF CBZ LEVEL IS LESS THAN 4 WILL GIVE EXTRA 100MG CBZ PRIOR TO PROCEDURE (400mg)\par \par \par RTC 3m\par

## 2023-01-18 ENCOUNTER — OUTPATIENT (OUTPATIENT)
Dept: OUTPATIENT SERVICES | Facility: HOSPITAL | Age: 23
LOS: 1 days | End: 2023-01-18

## 2023-01-18 DIAGNOSIS — H65.23 CHRONIC SEROUS OTITIS MEDIA, BILATERAL: Chronic | ICD-10-CM

## 2023-01-18 DIAGNOSIS — Z92.89 PERSONAL HISTORY OF OTHER MEDICAL TREATMENT: Chronic | ICD-10-CM

## 2023-01-18 DIAGNOSIS — Z00.8 ENCOUNTER FOR OTHER GENERAL EXAMINATION: ICD-10-CM

## 2023-02-15 ENCOUNTER — APPOINTMENT (OUTPATIENT)
Dept: NEUROLOGY | Facility: CLINIC | Age: 23
End: 2023-02-15
Payer: COMMERCIAL

## 2023-02-15 PROCEDURE — 99214 OFFICE O/P EST MOD 30 MIN: CPT | Mod: 95

## 2023-02-21 NOTE — ASSESSMENT
[FreeTextEntry1] : \par A/P-\par 1. PDD and Seizure disorder (past abnormal interictal EEG)- Recent seizure in January - 30 second event. \par - continue Depakote to 250-125-250 tid, switch over to Carbatrol fully(brand) 300-200-300\par - * If emesis within one hour of taking meds- give dose again\par - consider trial of LTG in lieu of VPA/CBZ in the fall\par \par 2. Behavior/ Impulse Control - behavior stable past couple weeks (worse after COVID)\par - Continue gabapentin, Abilify at present doses\par - continue Epidiolex 200 mg bid \par \par 3. Falls- no falls since last visit (?due to myoclonus at the time)\par \par 4. Hx Chiari/arachnoid cyst- re-order MRI brain- 2/22 at 715 AM\par - considering recent level of 3.1 \par -will give carbatrol 300 tid day before and day of procedure \par \par RTC 3m

## 2023-02-21 NOTE — HISTORY OF PRESENT ILLNESS
[Home] : at home, [unfilled] , at the time of the visit. [Medical Office: ([facility])___] : at the medical office located in  [Verbal consent obtained from patient] : the patient, [unfilled] [Mother] : mother [FreeTextEntry1] : cc- seizures/Autism\par \par Had COVID 1/17-22- could not get MRI.\par After was having heavy head banging on 1/25, and had a seizure on 1/28- went for long walk and lunch.\par Was staring at ceiling which he does some times. Then had a 30 sec seizure. No seizures since. \par 1/29-2/3- severe head banging.  2/4- threw up 3-4 times. ?concussion.\par No emesis since.\par \par Last week mother felt he was better.  CBZ level on 2/7- 3.1.\par Getting brand 100mg Carbatrol. On Feb 24th the 200s will change to brand.\par \par Past falls- ?myoclonus\par \par meds-\par Depakote to 250mg AM -125mg PM and 250mg hs \par Carbatrol 300-200- 300 \par Epidiolex 200 mg bid \par Abilify 7mg tid\par gabapentin 300 bid \par

## 2023-02-22 ENCOUNTER — OUTPATIENT (OUTPATIENT)
Dept: OUTPATIENT SERVICES | Facility: HOSPITAL | Age: 23
LOS: 1 days | End: 2023-02-22
Payer: COMMERCIAL

## 2023-02-22 ENCOUNTER — APPOINTMENT (OUTPATIENT)
Dept: MRI IMAGING | Facility: CLINIC | Age: 23
End: 2023-02-22
Payer: COMMERCIAL

## 2023-02-22 DIAGNOSIS — Z92.89 PERSONAL HISTORY OF OTHER MEDICAL TREATMENT: Chronic | ICD-10-CM

## 2023-02-22 DIAGNOSIS — H65.23 CHRONIC SEROUS OTITIS MEDIA, BILATERAL: Chronic | ICD-10-CM

## 2023-02-22 DIAGNOSIS — F84.0 AUTISTIC DISORDER: ICD-10-CM

## 2023-02-22 PROCEDURE — 70551 MRI BRAIN STEM W/O DYE: CPT

## 2023-02-22 PROCEDURE — 70551 MRI BRAIN STEM W/O DYE: CPT | Mod: 26

## 2023-02-23 ENCOUNTER — NON-APPOINTMENT (OUTPATIENT)
Age: 23
End: 2023-02-23

## 2023-04-04 ENCOUNTER — APPOINTMENT (OUTPATIENT)
Dept: NEUROLOGY | Facility: CLINIC | Age: 23
End: 2023-04-04
Payer: COMMERCIAL

## 2023-04-04 PROCEDURE — 99214 OFFICE O/P EST MOD 30 MIN: CPT | Mod: 95

## 2023-04-04 NOTE — ASSESSMENT
[FreeTextEntry1] : \par A/P-\par 1. PDD and Seizure disorder (past abnormal interictal EEG)- \par No seizure since last visit.\par - for now continue Depakote to 250-125-250 tid, switch over to Carbatrol fully(brand) 300-200-300 (level low despite increase likely auto-induction effect.\par - consider trial of LTG or Vimpat in lieu of VPA/CBZ in the fall, Not clear if VPA helpful\par - continue Epidiolex 200 mg bid \par - * If emesis within one hour of taking meds- give dose again\par \par 2. Behavior/ Impulse Control - behavior stable \par - Continue gabapentin, Abilify at present doses\par \par 3. Falls- no falls since last visit (?due to myoclonus at the time)\par \par 4. Hx Chiari/arachnoid cyst- MRI stable.\par \par RTC 2-3m. \par \par

## 2023-04-04 NOTE — HISTORY OF PRESENT ILLNESS
[FreeTextEntry1] : cc- seizures/Autism\par \par No seizures since last visit.\par Sleeps better- mother attributes to the gabapentin.\par Behaviors have been stable.\par Last seizure 1/28- brief ?related to recent COVID.\par No falls\par \par MRI 2/23- arachnoid cyst unchanged\par tonsils 2.7mm below foramen- no hydrocephalus\par no old hemorrhage\par \par meds-\par Depakote to 250mg AM -125mg PM and 250mg hs - 31.7\par Carbatrol 300-200- 300 -(3.2)\par Epidiolex 200 mg bid \par Abilify 7mg tid\par gabapentin 300 bid \par \par

## 2023-06-06 ENCOUNTER — APPOINTMENT (OUTPATIENT)
Dept: NEUROLOGY | Facility: CLINIC | Age: 23
End: 2023-06-06
Payer: COMMERCIAL

## 2023-06-06 PROCEDURE — 99214 OFFICE O/P EST MOD 30 MIN: CPT | Mod: 95

## 2023-06-06 NOTE — HISTORY OF PRESENT ILLNESS
[Home] : at home, [unfilled] , at the time of the visit. [Medical Office: ([facility])___] : at the medical office located in  [Verbal consent obtained from patient] : the patient, [unfilled] [FreeTextEntry1] : cc- seizure d/o\par \par Had a witnessed GTC on 4/19 at 817 AM - 1min 30 sec\par CBZ level was 4.2 was in May.\par None since.\par Prior seizure 1/28 post COVID.\par \par Has been sleeping 8.5 to 9 hrs. believes head banging prior to incident\par Behavior somewhat better\par \par meds-\par Depakote to 250mg AM -125mg PM and 250mg hs \par Carbatrol 300-200- 300 -(4.2)\par Epidiolex 200 mg bid \par Abilify 7mg tid\par gabapentin 300 bid \par \par

## 2023-06-06 NOTE — ASSESSMENT
[FreeTextEntry1] : \par A/P-\par 1. PDD and Seizure disorder (past abnormal interictal EEG)- \par One ealry AM seizure in April- no provocation. None since \par Last CBZ level 4.2 (high for him)\par -  continue Depakote to 250-125-250 tid, switch over to Carbatrol fully(brand) 300-200-300\par - if another seizure- consider trial of LTG or Vimpat in lieu of VPA/CBZ in the fall, Not clear if VPA helpful\par - continue Epidiolex 200 mg bid \par - * If emesis within one hour of taking meds- give dose again\par \par 2. Behavior/ Impulse Control - behavior sl improved by reports\par - Continue gabapentin, Abilify at present doses\par \par 3. Hx Chiari/arachnoid cyst- MRI stable without ventriculomegaly or definite chiari.\par \par RTC 3m \par \par

## 2023-08-01 ENCOUNTER — NON-APPOINTMENT (OUTPATIENT)
Age: 23
End: 2023-08-01

## 2023-08-22 ENCOUNTER — APPOINTMENT (OUTPATIENT)
Dept: NEUROLOGY | Facility: CLINIC | Age: 23
End: 2023-08-22
Payer: COMMERCIAL

## 2023-08-22 VITALS
BODY MASS INDEX: 21.19 KG/M2 | HEIGHT: 67 IN | DIASTOLIC BLOOD PRESSURE: 75 MMHG | SYSTOLIC BLOOD PRESSURE: 129 MMHG | WEIGHT: 135 LBS | HEART RATE: 99 BPM

## 2023-08-22 PROCEDURE — 99214 OFFICE O/P EST MOD 30 MIN: CPT

## 2023-08-22 NOTE — HISTORY OF PRESENT ILLNESS
[FreeTextEntry1] : cc- seizures  Had a seizure on 8/11- 2 and half minute GTC. Had not gotten his second lacosamide dose. Since lacosamide dose is 200mg bid. No seizures since.then. Has had intermittent agitation. Labs pending from 8/17  meds-  Depakote to 250mg AM -125mg PM and 250mg hs Epidiolex 200 mg bid Abilify 7mg tid gabapentin 300 bid

## 2023-08-22 NOTE — ASSESSMENT
[FreeTextEntry1] : A/P-  1. PDD and Seizure disorder (past abnormal interictal EEG)- One seizure since changing over to lacosamide when level was 3.7/ Now without seizures or side effects on lacosamide 200mg bid. - awaiting lacosamide level drawn last week. - off CBZ since July - continue Depakote to 250-125-250 tid, - continue Epidiolex 200 mg bid  - * If emesis within one hour of taking meds- give dose again  2. Behavior/ Impulse Control - behavior sl improved by reports - Continue gabapentin, Abilify at present doses  3. Hx Chiari/arachnoid cyst- Last MRI stable without ventriculomegaly or definite chiari.  RTC 3m

## 2023-11-28 ENCOUNTER — APPOINTMENT (OUTPATIENT)
Dept: NEUROLOGY | Facility: CLINIC | Age: 23
End: 2023-11-28
Payer: COMMERCIAL

## 2023-11-28 PROCEDURE — 99214 OFFICE O/P EST MOD 30 MIN: CPT | Mod: 95

## 2024-03-13 ENCOUNTER — NON-APPOINTMENT (OUTPATIENT)
Age: 24
End: 2024-03-13

## 2024-03-13 ENCOUNTER — APPOINTMENT (OUTPATIENT)
Dept: NEUROLOGY | Facility: CLINIC | Age: 24
End: 2024-03-13
Payer: COMMERCIAL

## 2024-03-13 PROCEDURE — 99443: CPT | Mod: 93

## 2024-06-12 ENCOUNTER — APPOINTMENT (OUTPATIENT)
Dept: NEUROLOGY | Facility: CLINIC | Age: 24
End: 2024-06-12
Payer: COMMERCIAL

## 2024-06-12 PROCEDURE — 99443: CPT

## 2024-06-12 NOTE — REASON FOR VISIT
[Home] : at home, [unfilled] , at the time of the visit. [Medical Office: ([facility])___] : at the medical office located in  [Mother] : mother [Follow-Up: _____] : a [unfilled] follow-up visit [This encounter was initiated by telehealth (audio with video) and converted to telephone (audio only) due to technical difficulties.] : This encounter was initiated by telehealth (audio with video) and converted to telephone (audio only) due to technical difficulties. [FreeTextEntry2] : mother

## 2024-06-12 NOTE — ASSESSMENT
[FreeTextEntry1] : A/P- 1. PDD and seizure disorder (past abnormal interictal EEG)- Last seizure August 2023 while switching on to lacosamide. Now without seizures on lacosamide 200mg bid.  Has had more seizures in the summer - consider hydration with electrolytes on hot days. - continue lacosamide 200 bid, Depakote to 250-125-250 tid, Epidiolex 200 mg bid -needs cbc, cmp, valproic acid, lacosamide, A1C, b12, TSH  *** should have food prior to morning meds ** If emesis within one hour of taking meds- give dose again  2. Behavior/ Impulse Control - still with some behavioral issues - Continue gabapentin, Abilify at present doses  3. Hx Chiari/arachnoid cyst- Last MRI Feb 2023, stable without ventriculomegaly or definite Chiari. No need for new MRI   RTC 4m
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
## 2024-06-18 ENCOUNTER — TRANSCRIPTION ENCOUNTER (OUTPATIENT)
Age: 24
End: 2024-06-18

## 2024-09-24 ENCOUNTER — APPOINTMENT (OUTPATIENT)
Dept: NEUROLOGY | Facility: CLINIC | Age: 24
End: 2024-09-24
Payer: COMMERCIAL

## 2024-09-24 PROCEDURE — 99215 OFFICE O/P EST HI 40 MIN: CPT

## 2024-09-24 NOTE — ASSESSMENT
[FreeTextEntry1] : A/P- 1. PDD and seizure disorder (past abnormal interictal EEG)- Last seizure August 2023 while switching on to lacosamide. Now without seizures on lacosamide 200mg bid. Level 7.4. - continue lacosamide 200 bid, Depakote to 250-125-250 tid, Epidiolex 200 mg bid - consider very slow taper of VPA in January.  *** should have food prior to morning meds ** If emesis within one hour of taking meds- give dose again  2. Behavior/ Impulse Control -behavioral issues improved since spring. However, two new unusual head contusions over right parietal region- unusual (typical frontal from head banging). Observe for falls or lateral head banging or obvious mechanisms. - Continue gabapentin, Abilify at present doses  3. Hx Chiari/arachnoid cyst- Last MRI Feb 2023, stable without ventriculomegaly or definite Chiari. Hold on new MRI unless new behavioral or gait changes.   RTC 4m.

## 2024-09-24 NOTE — HISTORY OF PRESENT ILLNESS
[FreeTextEntry1] : cc- seizures and autism  Still no seizures since last August 23 Overall sleeping well. Behaviors reduced since May. However, he has two new small scalp hematomas over right parietal region but unclear how they occurred.  Recent labs show Lac 7.4 and vpa of 29.5.   meds- Depakote to 250mg AM -125mg PM and 250mg hs Epidiolex 200 mg bid Abilify 7mg tid gabapentin 300 bid vit D folate lacosamide 200 bid- 5.4 ES Tylenol 1000mg prn q12h

## 2024-09-24 NOTE — PHYSICAL EXAM
[FreeTextEntry1] : Neuro- alert, some vocalizations appropriated for questions smiling, some very light head hitting with arm and hand.  no tremor moves all exts well gait narrow based  two 1x3cm tender hematomas right parieto-occ and parieto-temp region

## 2025-01-07 ENCOUNTER — APPOINTMENT (OUTPATIENT)
Dept: NEUROLOGY | Facility: CLINIC | Age: 25
End: 2025-01-07
Payer: COMMERCIAL

## 2025-01-07 PROCEDURE — 99213 OFFICE O/P EST LOW 20 MIN: CPT | Mod: 95

## 2025-04-08 ENCOUNTER — APPOINTMENT (OUTPATIENT)
Dept: NEUROLOGY | Facility: CLINIC | Age: 25
End: 2025-04-08
Payer: COMMERCIAL

## 2025-04-08 PROCEDURE — 99214 OFFICE O/P EST MOD 30 MIN: CPT | Mod: 95

## 2025-04-23 ENCOUNTER — NON-APPOINTMENT (OUTPATIENT)
Age: 25
End: 2025-04-23

## 2025-07-09 ENCOUNTER — APPOINTMENT (OUTPATIENT)
Dept: NEUROLOGY | Facility: CLINIC | Age: 25
End: 2025-07-09
Payer: COMMERCIAL

## 2025-07-09 PROCEDURE — 99213 OFFICE O/P EST LOW 20 MIN: CPT | Mod: 95

## 2025-07-11 ENCOUNTER — NON-APPOINTMENT (OUTPATIENT)
Age: 25
End: 2025-07-11

## 2025-07-18 ENCOUNTER — NON-APPOINTMENT (OUTPATIENT)
Age: 25
End: 2025-07-18

## 2025-09-03 ENCOUNTER — NON-APPOINTMENT (OUTPATIENT)
Age: 25
End: 2025-09-03